# Patient Record
Sex: FEMALE | Race: WHITE | NOT HISPANIC OR LATINO | Employment: FULL TIME | URBAN - METROPOLITAN AREA
[De-identification: names, ages, dates, MRNs, and addresses within clinical notes are randomized per-mention and may not be internally consistent; named-entity substitution may affect disease eponyms.]

---

## 2023-09-22 ENCOUNTER — APPOINTMENT (EMERGENCY)
Dept: RADIOLOGY | Facility: HOSPITAL | Age: 33
End: 2023-09-22
Payer: COMMERCIAL

## 2023-09-22 ENCOUNTER — HOSPITAL ENCOUNTER (EMERGENCY)
Facility: HOSPITAL | Age: 33
Discharge: HOME | End: 2023-09-22
Attending: EMERGENCY MEDICINE
Payer: COMMERCIAL

## 2023-09-22 VITALS
RESPIRATION RATE: 18 BRPM | OXYGEN SATURATION: 98 % | HEART RATE: 73 BPM | DIASTOLIC BLOOD PRESSURE: 99 MMHG | TEMPERATURE: 98 F | SYSTOLIC BLOOD PRESSURE: 152 MMHG

## 2023-09-22 DIAGNOSIS — N94.89 PELVIC CONGESTION: ICD-10-CM

## 2023-09-22 DIAGNOSIS — R10.32 LLQ ABDOMINAL PAIN: Primary | ICD-10-CM

## 2023-09-22 LAB
ALBUMIN SERPL BCP-MCNC: 4.4 G/DL (ref 3.5–5)
ALP SERPL-CCNC: 49 U/L (ref 34–104)
ALT SERPL W P-5'-P-CCNC: 11 U/L (ref 7–52)
ANION GAP SERPL CALCULATED.3IONS-SCNC: 7 MMOL/L
AST SERPL W P-5'-P-CCNC: 14 U/L (ref 13–39)
BACTERIA UR QL AUTO: ABNORMAL /HPF
BASOPHILS # BLD AUTO: 0.02 THOUSANDS/ÂΜL (ref 0–0.1)
BASOPHILS NFR BLD AUTO: 0 % (ref 0–1)
BILIRUB SERPL-MCNC: 0.68 MG/DL (ref 0.2–1)
BILIRUB UR QL STRIP: NEGATIVE
BUN SERPL-MCNC: 15 MG/DL (ref 5–25)
CALCIUM SERPL-MCNC: 9.3 MG/DL (ref 8.4–10.2)
CHLORIDE SERPL-SCNC: 102 MMOL/L (ref 96–108)
CLARITY UR: CLEAR
CO2 SERPL-SCNC: 29 MMOL/L (ref 21–32)
COLOR UR: YELLOW
CREAT SERPL-MCNC: 0.63 MG/DL (ref 0.6–1.3)
EOSINOPHIL # BLD AUTO: 0.13 THOUSAND/ÂΜL (ref 0–0.61)
EOSINOPHIL NFR BLD AUTO: 3 % (ref 0–6)
ERYTHROCYTE [DISTWIDTH] IN BLOOD BY AUTOMATED COUNT: 12.7 % (ref 11.6–15.1)
EXT PREGNANCY TEST URINE: NEGATIVE
EXT. CONTROL: NORMAL
GFR SERPL CREATININE-BSD FRML MDRD: 119 ML/MIN/1.73SQ M
GLUCOSE SERPL-MCNC: 87 MG/DL (ref 65–140)
GLUCOSE UR STRIP-MCNC: NEGATIVE MG/DL
HCT VFR BLD AUTO: 38 % (ref 34.8–46.1)
HGB BLD-MCNC: 12.7 G/DL (ref 11.5–15.4)
HGB UR QL STRIP.AUTO: ABNORMAL
HYALINE CASTS #/AREA URNS LPF: ABNORMAL /LPF
IMM GRANULOCYTES # BLD AUTO: 0 THOUSAND/UL (ref 0–0.2)
IMM GRANULOCYTES NFR BLD AUTO: 0 % (ref 0–2)
KETONES UR STRIP-MCNC: NEGATIVE MG/DL
LEUKOCYTE ESTERASE UR QL STRIP: NEGATIVE
LYMPHOCYTES # BLD AUTO: 1.55 THOUSANDS/ÂΜL (ref 0.6–4.47)
LYMPHOCYTES NFR BLD AUTO: 29 % (ref 14–44)
MCH RBC QN AUTO: 30.5 PG (ref 26.8–34.3)
MCHC RBC AUTO-ENTMCNC: 33.4 G/DL (ref 31.4–37.4)
MCV RBC AUTO: 91 FL (ref 82–98)
MONOCYTES # BLD AUTO: 0.39 THOUSAND/ÂΜL (ref 0.17–1.22)
MONOCYTES NFR BLD AUTO: 7 % (ref 4–12)
MUCOUS THREADS UR QL AUTO: ABNORMAL
NEUTROPHILS # BLD AUTO: 3.21 THOUSANDS/ÂΜL (ref 1.85–7.62)
NEUTS SEG NFR BLD AUTO: 61 % (ref 43–75)
NITRITE UR QL STRIP: NEGATIVE
NON-SQ EPI CELLS URNS QL MICRO: ABNORMAL /HPF
NRBC BLD AUTO-RTO: 0 /100 WBCS
PH UR STRIP.AUTO: 5.5 [PH] (ref 4.5–8)
PLATELET # BLD AUTO: 308 THOUSANDS/UL (ref 149–390)
PMV BLD AUTO: 8.9 FL (ref 8.9–12.7)
POTASSIUM SERPL-SCNC: 3.6 MMOL/L (ref 3.5–5.3)
PROT SERPL-MCNC: 7.5 G/DL (ref 6.4–8.4)
PROT UR STRIP-MCNC: ABNORMAL MG/DL
RBC # BLD AUTO: 4.16 MILLION/UL (ref 3.81–5.12)
RBC #/AREA URNS AUTO: ABNORMAL /HPF
SODIUM SERPL-SCNC: 138 MMOL/L (ref 135–147)
SP GR UR STRIP.AUTO: >=1.03 (ref 1–1.03)
UROBILINOGEN UR QL STRIP.AUTO: 0.2 E.U./DL
WBC # BLD AUTO: 5.3 THOUSAND/UL (ref 4.31–10.16)
WBC #/AREA URNS AUTO: ABNORMAL /HPF

## 2023-09-22 PROCEDURE — 36415 COLL VENOUS BLD VENIPUNCTURE: CPT

## 2023-09-22 PROCEDURE — 76856 US EXAM PELVIC COMPLETE: CPT

## 2023-09-22 PROCEDURE — 99284 EMERGENCY DEPT VISIT MOD MDM: CPT

## 2023-09-22 PROCEDURE — 99284 EMERGENCY DEPT VISIT MOD MDM: CPT | Performed by: EMERGENCY MEDICINE

## 2023-09-22 PROCEDURE — 81001 URINALYSIS AUTO W/SCOPE: CPT

## 2023-09-22 PROCEDURE — 76830 TRANSVAGINAL US NON-OB: CPT

## 2023-09-22 PROCEDURE — 81025 URINE PREGNANCY TEST: CPT

## 2023-09-22 PROCEDURE — 96376 TX/PRO/DX INJ SAME DRUG ADON: CPT

## 2023-09-22 PROCEDURE — 80053 COMPREHEN METABOLIC PANEL: CPT

## 2023-09-22 PROCEDURE — 85025 COMPLETE CBC W/AUTO DIFF WBC: CPT

## 2023-09-22 PROCEDURE — 96374 THER/PROPH/DIAG INJ IV PUSH: CPT

## 2023-09-22 RX ORDER — KETOROLAC TROMETHAMINE 30 MG/ML
15 INJECTION, SOLUTION INTRAMUSCULAR; INTRAVENOUS ONCE
Status: COMPLETED | OUTPATIENT
Start: 2023-09-22 | End: 2023-09-22

## 2023-09-22 RX ADMIN — KETOROLAC TROMETHAMINE 15 MG: 30 INJECTION, SOLUTION INTRAMUSCULAR; INTRAVENOUS at 16:10

## 2023-09-22 RX ADMIN — KETOROLAC TROMETHAMINE 15 MG: 30 INJECTION, SOLUTION INTRAMUSCULAR; INTRAVENOUS at 18:23

## 2023-09-22 NOTE — ED ATTENDING ATTESTATION
9/22/2023  IDiana DO, saw and evaluated the patient. I have discussed the patient with the resident/non-physician practitioner and agree with the resident's/non-physician practitioner's findings, Plan of Care, and MDM as documented in the resident's/non-physician practitioner's note, except where noted. All available labs and Radiology studies were reviewed. I was present for key portions of any procedure(s) performed by the resident/non-physician practitioner and I was immediately available to provide assistance. At this point I agree with the current assessment done in the Emergency Department. I have conducted an independent evaluation of this patient a history and physical is as follows:    52-year-old female presents for left lower quadrant abdominal pain. The patient reports that 5 days ago she had severe left lower quadrant abdominal pain. Actually somewhat better since onset. No nausea vomiting no dysuria. Just finished her period. Was on oral contraceptives up until February of this year and has been attempting to get pregnant since then. No menomenorrhagia. Scant bleeding now. On exam tenderness in left lower quadrant no peritonitis.   Vital signs normal.  Differential includes ectopic pregnancy, intrauterine pregnancy, ovarian cyst or ovarian torsion plan urine pregnancy pain control ultrasound rule out ovarian torsion/cyst.    ED Course         Critical Care Time  Procedures

## 2023-09-22 NOTE — DISCHARGE INSTRUCTIONS
Please follow up with your family care physician and your OBGYN for further evaluation and management.

## 2023-09-22 NOTE — ED PROVIDER NOTES
History  Chief Complaint   Patient presents with   • Abdominal Pain     LLQ abdominal pain that started yesterday. Nausea and chills that started on Monday. HPI  Patient is 45-year-old female presenting for left lower quadrant abdominal pain. No significant past medical history. Patient states she has been having nausea and chills that started on Monday, left lower quadrant sharp abdominal pain nonradiating and continuous. Patient states she recently stopped taking birth control as she and her partner are trying to start a family, states her period just ended 24 hours ago. Patient denies any vaginal discharge or irregular vaginal bleeding. Patient states her usual menstrual cramps have mostly subsided. Patient denies any concerns of STIs between her or her partner. Patient denies any other complaints at this time, denies any infectious symptoms. Prior to Admission Medications   Prescriptions Last Dose Informant Patient Reported? Taking? Ocella 3-0.03 MG per tablet   Yes No   loperamide (IMODIUM) 2 mg capsule   No No   Sig: Take 1 capsule (2 mg total) by mouth every 6 (six) hours as needed for diarrhea   methylPREDNISolone 4 MG tablet therapy pack   No No   Sig: Use as directed on package   ondansetron (Zofran ODT) 4 mg disintegrating tablet   No No   Sig: Take 1 tablet (4 mg total) by mouth every 6 (six) hours as needed for nausea or vomiting      Facility-Administered Medications: None       History reviewed. No pertinent past medical history. Past Surgical History:   Procedure Laterality Date   • BREAST SURGERY Left        History reviewed. No pertinent family history. I have reviewed and agree with the history as documented.     E-Cigarette/Vaping   • E-Cigarette Use Never User      E-Cigarette/Vaping Substances     Social History     Tobacco Use   • Smoking status: Never   • Smokeless tobacco: Never   Vaping Use   • Vaping Use: Never used   Substance Use Topics   • Alcohol use: Yes     Comment: socially   • Drug use: Never        Review of Systems   Constitutional: Negative. HENT: Negative. Eyes: Negative. Respiratory: Negative. Cardiovascular: Negative. Gastrointestinal: Positive for abdominal pain and nausea. Endocrine: Negative. Genitourinary: Negative. Musculoskeletal: Negative. Skin: Negative. Allergic/Immunologic: Negative. Neurological: Negative. Hematological: Negative. Psychiatric/Behavioral: Negative. Physical Exam  ED Triage Vitals   Temperature Pulse Respirations Blood Pressure SpO2   09/22/23 1521 09/22/23 1521 09/22/23 1521 09/22/23 1521 09/22/23 1521   98 °F (36.7 °C) 73 18 152/99 98 %      Temp Source Heart Rate Source Patient Position - Orthostatic VS BP Location FiO2 (%)   09/22/23 1521 09/22/23 1521 09/22/23 1521 09/22/23 1521 --   Temporal Monitor Sitting Left arm       Pain Score       09/22/23 1610       7             Orthostatic Vital Signs  Vitals:    09/22/23 1521   BP: 152/99   Pulse: 73   Patient Position - Orthostatic VS: Sitting       Physical Exam  Vitals and nursing note reviewed. Constitutional:       Appearance: She is well-developed and normal weight. HENT:      Head: Normocephalic and atraumatic. Mouth/Throat:      Mouth: Mucous membranes are moist.      Pharynx: Oropharynx is clear. Eyes:      Extraocular Movements: Extraocular movements intact. Pupils: Pupils are equal, round, and reactive to light. Cardiovascular:      Rate and Rhythm: Normal rate and regular rhythm. Heart sounds: Normal heart sounds. Pulmonary:      Effort: Pulmonary effort is normal.      Breath sounds: Normal breath sounds. Abdominal:      General: Abdomen is flat. Bowel sounds are normal.      Palpations: Abdomen is soft. Tenderness: There is abdominal tenderness in the left lower quadrant. Hernia: No hernia is present.       Comments: Patient has tenderness palpation left lower quadrant however abdomen soft, nondistended, no rebound or guarding. No other tenderness to palpation of other quadrants. No palpable masses. Skin:     General: Skin is warm. Capillary Refill: Capillary refill takes less than 2 seconds. Neurological:      General: No focal deficit present. Mental Status: She is alert and oriented to person, place, and time.    Psychiatric:         Mood and Affect: Mood normal.         Behavior: Behavior normal.         ED Medications  Medications   ketorolac (TORADOL) injection 15 mg (15 mg Intravenous Given 9/22/23 1610)   ketorolac (TORADOL) injection 15 mg (15 mg Intravenous Given 9/22/23 1823)       Diagnostic Studies  Results Reviewed     Procedure Component Value Units Date/Time    CMP [[id number]] Collected: 09/22/23 1555    Lab Status: Final result Specimen: Blood from Arm, Right Updated: 09/22/23 1630     Sodium 138 mmol/L      Potassium 3.6 mmol/L      Chloride 102 mmol/L      CO2 29 mmol/L      ANION GAP 7 mmol/L      BUN 15 mg/dL      Creatinine 0.63 mg/dL      Glucose 87 mg/dL      Calcium 9.3 mg/dL      AST 14 U/L      ALT 11 U/L      Alkaline Phosphatase 49 U/L      Total Protein 7.5 g/dL      Albumin 4.4 g/dL      Total Bilirubin 0.68 mg/dL      eGFR 119 ml/min/1.73sq m     Narrative:      [place] guidelines for Chronic Kidney Disease (CKD):   •  Stage 1 with normal or high GFR (GFR > 90 mL/min/1.73 square meters)  •  Stage 2 Mild CKD (GFR = 60-89 mL/min/1.73 square meters)  •  Stage 3A Moderate CKD (GFR = 45-59 mL/min/1.73 square meters)  •  Stage 3B Moderate CKD (GFR = 30-44 mL/min/1.73 square meters)  •  Stage 4 Severe CKD (GFR = 15-29 mL/min/1.73 square meters)  •  Stage 5 End Stage CKD (GFR <15 mL/min/1.73 square meters)  Note: GFR calculation is accurate only with a steady state creatinine    Urine Microscopic [[id number]]  (Abnormal) Collected: 09/22/23 1605    Lab Status: Final result Specimen: Urine, Clean Catch Updated: 09/22/23 1629     RBC, UA 1-2 /hpf      WBC, UA 1-2 /hpf      Epithelial Cells Occasional /hpf      Bacteria, UA Occasional /hpf      MUCUS THREADS Moderate     Hyaline Casts, UA 0-3 /lpf     CBC and differential [[id number]] Collected: 09/22/23 1555    Lab Status: Final result Specimen: Blood from Arm, Right Updated: 09/22/23 1613     WBC 5.30 Thousand/uL      RBC 4.16 Million/uL      Hemoglobin 12.7 g/dL      Hematocrit 38.0 %      MCV 91 fL      MCH 30.5 pg      MCHC 33.4 g/dL      RDW 12.7 %      MPV 8.9 fL      Platelets 804 Thousands/uL      nRBC 0 /100 WBCs      Neutrophils Relative 61 %      Immat GRANS % 0 %      Lymphocytes Relative 29 %      Monocytes Relative 7 %      Eosinophils Relative 3 %      Basophils Relative 0 %      Neutrophils Absolute 3.21 Thousands/µL      Immature Grans Absolute 0.00 Thousand/uL      Lymphocytes Absolute 1.55 Thousands/µL      Monocytes Absolute 0.39 Thousand/µL      Eosinophils Absolute 0.13 Thousand/µL      Basophils Absolute 0.02 Thousands/µL     POCT pregnancy, urine [[id number]]  (Normal) Resulted: 09/22/23 1607    Lab Status: Final result Specimen: Urine Updated: 09/22/23 1607     EXT Preg Test, Ur Negative     Control Valid    Urine Macroscopic, POC [[id number]]  (Abnormal) Collected: 09/22/23 1605    Lab Status: Final result Specimen: Urine Updated: 09/22/23 1607     Color, UA Yellow     Clarity, UA Clear     pH, UA 5.5     Leukocytes, UA Negative     Nitrite, UA Negative     Protein, UA Trace mg/dl      Glucose, UA Negative mg/dl      Ketones, UA Negative mg/dl      Urobilinogen, UA 0.2 E.U./dl      Bilirubin, UA Negative     Occult Blood, UA Large     Specific Gravity, UA >=1.030    Narrative:      [facility] RESULT                 US pelvis complete w transvaginal   Final Result by Neida Howard MD (09/22 1753)      1. Normal ovaries. Ovarian Doppler flow is within normal limits. 2.  Pelvic varices on the left. Correlate for possible pelvic congestion.                            Workstation performed: [id number]               Procedures  Procedures      ED Course                                       Medical Decision Making  Patient is 71-year-old female presenting for left lower quadrant abdominal pain. DDx: Ovarian torsion, intrauterine versus ectopic pregnancy, menstrual cramps, UTI  Based on patient presentation and physical exam findings, primary concern is for ovarian torsion rule out. Will obtain baseline lab work and ultrasound pelvis. Also obtain UA and urine pregnant. If ultrasound negative and UA and urine pregnant negative, plan for continued Toradol given for pain. UA, urine pregnant, ultrasound pelvis negative for any acute findings. Lab work negative for any acute findings. Patient states symptoms improved following Toradol. Plan at this time for follow-up with finding of pelvic congestion with OB/GYN or family care doctor. Return precautions given. Patient understands agrees. Ready for discharge. LLQ abdominal pain: acute illness or injury  Pelvic congestion: acute illness or injury  Amount and/or Complexity of Data Reviewed  Labs: ordered. Radiology: ordered. Risk  Prescription drug management. Disposition  Final diagnoses:   LLQ abdominal pain   Pelvic congestion     Time reflects when diagnosis was documented in both MDM as applicable and the Disposition within this note     Time User Action Codes Description Comment    9/22/2023  6:03 PM Meredith Manjarrez Add [R10.32] LLQ abdominal pain     9/22/2023  6:05 PM Meredith Manjarrez Add [P08.00] Pelvic congestion       ED Disposition     ED Disposition   Discharge    Condition   Stable    Date/Time   Fri Sep 22, 2023  6:03 PM    Sudheer discharge to home/self care.                Follow-up Information     Follow up With Specialties Details Why Contact Info Additional [address] [facility] Emergency Department Emergency Medicine Go to  If symptoms worsen [address]   [facility] [address] [address]  [facility] Emergency Department, [address] [facility], [place], [place], [place]    Sagar Aas, DO  Go to  If symptoms worsen [address] Obstetrics and Gynecology   [address] [facility] [address]  [address] [place] [address], [place], [place], [id number] [phone]          Discharge Medication List as of 9/22/2023  6:06 PM      CONTINUE these medications which have NOT CHANGED    Details   loperamide (IMODIUM) 2 mg capsule Take 1 capsule (2 mg total) by mouth every 6 (six) hours as needed for diarrhea, Starting Sat 2/18/2023, Normal      methylPREDNISolone 4 MG tablet therapy pack Use as directed on package, Normal      Ocella 3-0.03 MG per tablet Starting Fri 12/2/2022, Historical Med      ondansetron (Zofran ODT) 4 mg disintegrating tablet Take 1 tablet (4 mg total) by mouth every 6 (six) hours as needed for nausea or vomiting, Starting Sat 2/18/2023, Normal           No discharge procedures on file. PDMP Review     None           ED Provider  Attending physically available and evaluated [place] . I managed the patient along with the ED Attending.     Electronically Signed by         Guillermo Nichole MD  09/22/23 2007

## 2023-09-22 NOTE — Clinical Note
Carson Nails was seen and treated in our emergency department on 9/22/2023. Diagnosis:     Betzy Fisher  may return to work on return date. She may return on this date: 09/25/2023         If you have any questions or concerns, please don't hesitate to call.       Jessy Bach MD    ______________________________           _______________          _______________  [facility] Representative                              Date                                Time

## 2023-12-06 DIAGNOSIS — Z00.6 ENCOUNTER FOR EXAMINATION FOR NORMAL COMPARISON OR CONTROL IN CLINICAL RESEARCH PROGRAM: ICD-10-CM
